# Patient Record
Sex: FEMALE | Race: WHITE | NOT HISPANIC OR LATINO | Employment: STUDENT | URBAN - METROPOLITAN AREA
[De-identification: names, ages, dates, MRNs, and addresses within clinical notes are randomized per-mention and may not be internally consistent; named-entity substitution may affect disease eponyms.]

---
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
## 2022-04-10 ENCOUNTER — NURSE TRIAGE (OUTPATIENT)
Dept: CALL CENTER | Facility: HOSPITAL | Age: 22
End: 2022-04-10

## 2022-04-10 NOTE — TELEPHONE ENCOUNTER
"    Reason for Disposition  • [1] SEVERE vomiting (e.g., 6 or more times/day, vomits everything) BUT [2] hydrated    Additional Information  • Negative: Shock suspected (e.g., cold/pale/clammy skin, too weak to stand, low BP, rapid pulse)  • Negative: Difficult to awaken or acting confused (e.g., disoriented, slurred speech)  • Negative: Sounds like a life-threatening emergency to the triager  • Negative: Vomiting occurs only while coughing  • Negative: [1] Pregnant < 20 Weeks AND [2] nausea/vomiting began in early pregnancy (i.e., 4-8 weeks pregnant)  • Negative: Chest pain  • Negative: Headache is main symptom  • Negative: Vomiting (or Nausea) in a cancer patient who is currently (or recently) receiving chemotherapy or radiation therapy, or cancer patient who has metastatic or end-stage cancer and is receiving palliative care  • Negative: [1] Vomiting AND [2] contains red blood or black (\"coffee ground\") material  (Exception: few red streaks in vomit that only happened once)  • Negative: Severe pain in one eye  • Negative: Recent head injury (within last 3 days)  • Negative: Recent abdominal injury (within last 3 days)  • Negative: [1] Insulin-dependent diabetes (Type I) AND [2] glucose > 400 mg/dl (22 mmol/l)  • Negative: [1] Vomiting AND [2] hernia is more painful or swollen than usual  • Negative: [1] SEVERE vomiting (e.g., 6 or more times/day) AND [2] present > 8 hours (Exception: patient sounds well, is drinking liquids, does not sound dehydrated, and vomiting has lasted less than 24 hours)  • Negative: [1] MODERATE vomiting (e.g., 3 - 5 times/day) AND [2] age > 60 years  • Negative: Severe headache (e.g., excruciating) (Exception: similar to previous migraines)  • Negative: High-risk adult (e.g., diabetes mellitus, brain tumor, V-P shunt, hernia)  • Negative: [1] Drinking very little AND [2] dehydration suspected (e.g., no urine > 12 hours, very dry mouth, very lightheaded)  • Negative: Patient sounds very " "sick or weak to the triager  • Negative: [1] Vomiting AND [2] abdomen looks much more swollen than usual  • Negative: [1] Vomiting AND [2] contains bile (green color)  • Negative: [1] Constant abdominal pain AND [2] present > 2 hours  • Negative: [1] Fever > 103 F (39.4 C) AND [2] not able to get the fever down using Fever Care Advice  • Negative: [1] Fever > 101 F (38.3 C) AND [2] age > 60 years  • Negative: [1] Fever > 100.0 F (37.8 C) AND [2] bedridden (e.g., nursing home patient, CVA, chronic illness, recovering from surgery)  • Negative: [1] Fever > 100.0 F (37.8 C) AND [2] weak immune system (e.g., HIV positive, cancer chemo, splenectomy, organ transplant, chronic steroids)  • Negative: Taking any of the following medications: digoxin (Lanoxin), lithium, theophylline, phenytoin (Dilantin)  • Negative: [1] MILD or MODERATE vomiting AND [2] present > 48 hours (2 days) (Exception: mild vomiting with associated diarrhea)  • Negative: Fever present > 3 days (72 hours)  • Negative: Vomiting a prescription medication  • Negative: [1] MILD vomiting with diarrhea AND [2] present > 5 days  • Negative: Substance use (drug use) or unhealthy alcohol use, known or suspected  • Negative: Vomiting is a chronic symptom (recurrent or ongoing AND present > 4 weeks)  • Negative: MILD or MODERATE vomiting (e.g., 1 - 5 times / day)  • Negative: MILD vomiting with diarrhea  • Negative: Possibly pregnant, questions about  • Negative: Fever treatment, questions about    Answer Assessment - Initial Assessment Questions  1. VOMITING SEVERITY: \"How many times have you vomited in the past 24 hours?\"      - MILD:  1 - 2 times/day     - MODERATE: 3 - 5 times/day, decreased oral intake without significant weight loss or symptoms of dehydration     - SEVERE: 6 or more times/day, vomits everything or nearly everything, with significant weight loss, symptoms of dehydration       6-7  2. ONSET: \"When did the vomiting begin?\"       midnight  3. " "FLUIDS: \"What fluids or food have you vomited up today?\" \"Have you been able to keep any fluids down?\"      Gatorade. water  4. ABDOMINAL PAIN: \"Are your having any abdominal pain?\" If yes : \"How bad is it and what does it feel like?\" (e.g., crampy, dull, intermittent, constant)       Cramping yes  5. DIARRHEA: \"Is there any diarrhea?\" If Yes, ask: \"How many times today?\"       3 times so far  6. 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 CAUSE: \"What do you think is causing your vomiting?\"      *stomach bug  8. HYDRATION STATUS: \"Any signs of dehydration?\" (e.g., dry mouth [not only dry lips], too weak to stand) \"When did you last urinate?\"      Denies dehydration  9. OTHER SYMPTOMS: \"Do you have any other symptoms?\" (e.g., fever, headache, vertigo, vomiting blood or coffee grounds, recent head injury)      n/a  10. PREGNANCY: \"Is there any chance you are pregnant?\" \"When was your last menstrual period?\"        n/a    Protocols used: VOMITING-ADULT-AH      "

## 2024-03-19 ENCOUNTER — OFFICE VISIT (OUTPATIENT)
Dept: INTERNAL MEDICINE | Facility: CLINIC | Age: 24
End: 2024-03-19
Payer: COMMERCIAL

## 2024-03-19 VITALS
HEART RATE: 76 BPM | SYSTOLIC BLOOD PRESSURE: 100 MMHG | RESPIRATION RATE: 12 BRPM | TEMPERATURE: 97.1 F | BODY MASS INDEX: 28.31 KG/M2 | HEIGHT: 64 IN | OXYGEN SATURATION: 100 % | DIASTOLIC BLOOD PRESSURE: 64 MMHG | WEIGHT: 165.8 LBS

## 2024-03-19 DIAGNOSIS — K58.2 IRRITABLE BOWEL SYNDROME WITH BOTH CONSTIPATION AND DIARRHEA: ICD-10-CM

## 2024-03-19 DIAGNOSIS — K92.1 BLOOD IN STOOL: Primary | ICD-10-CM

## 2024-03-19 PROCEDURE — 99214 OFFICE O/P EST MOD 30 MIN: CPT

## 2024-03-19 RX ORDER — CETIRIZINE HYDROCHLORIDE 10 MG/1
10 TABLET ORAL AS NEEDED
COMMUNITY

## 2024-03-19 RX ORDER — POLYETHYLENE GLYCOL 3350 17 G/17G
17 POWDER, FOR SOLUTION ORAL DAILY PRN
COMMUNITY

## 2024-03-19 NOTE — PROGRESS NOTES
"     New Patient Office Visit      Date: 2024  Patient Name: Brook Webb  : 2000   MRN: [id number]     Chief Complaint:    Chief Complaint   Patient presents with    Follow-up     Urgent care follow up 2024  \"Blood in stool  Anal bleeding   Abdominal pain with tenderness in the suprapubic area\"    No other episodes of blood in stool       Establish Care       History of Present Illness: Brook Webb is a 23 y.o. female who is here today to establish care.     She is from [place], played soccer at [employer], works at [employer] as a .     Brook reports having 2 episodes of blood in stool last week. She went to urgent care and states they checked her hemoglobin, which was normal, but was referred to PCP for further evaluation. Reports moderate amount of bright red blood after having bowel movement. She says the blood was dripping and it took several wipes to clean up. States she was constipated last week and did have to strain some with her BM but denies pain with BM s. She denies having abdominal pain, cramping or nausea/vomiting. Denies any episodes of blood in her stool since. She denies every having hemorrhoids.    She has a hx of IBS, states she was dx in . Did see a GI dr then and had colonoscopy completed in . Results were negative for any polyps or lesions but she does have a small hiatal hernia. For the pat 2 years has been dealing with alternating bowel patterns. Will have several days of diarrhea and then will have bouts of constipation and bloating. She does not take any medications for this, has worked on her diet some to help with symptoms. She does have a f/u with her GI at  in 1 month via zoom, requested to be seen in person but they will only see her telehealth. Is interested in referral to GI with [facility] for evaluation. She is currently taking miralax bid and increasing her water and fiber intake. She is active and eats a healthy diet. " "    Denies any family hx of colon cancer, crohn's disease or ulcerative colitis    Subjective      Review of Systems:   Review of Systems     Past Medical History:   Past Medical History:   Diagnosis Date    Anxiety     Asthma 6 years old    Heart murmur Birth    IBS (irritable bowel syndrome)        Past Surgical History:   Past Surgical History:   Procedure Laterality Date    ADENOIDECTOMY      COLONOSCOPY  2022    TONSILLECTOMY         Family History:   Family History   Problem Relation Age of Onset    Alcohol abuse Father        Social History:   Social History     Socioeconomic History    Marital status: Single   Tobacco Use    Smoking status: Never    Smokeless tobacco: Never   Vaping Use    Vaping status: Never Used   Substance and Sexual Activity    Alcohol use: Yes     Alcohol/week: 3.0 standard drinks of alcohol     Types: 3 Drinks containing 0.5 oz of alcohol per week    Drug use: Never    Sexual activity: Yes     Partners: Male     Birth control/protection: Birth control pill       Medications:     Current Outpatient Medications:     ALBUTEROL SULFATE IN, , Disp: , Rfl:     cetirizine (ZyrTEC Allergy) 10 MG tablet, Take 1 tablet by mouth As Needed for Allergies., Disp: , Rfl:     escitalopram (LEXAPRO) 20 MG tablet, TAKE ONE TABLET BY MOUTH DAILY for 30, Disp: , Rfl:     Clare 0.25-35 MG-MCG per tablet, Take 1 tablet by mouth Daily., Disp: , Rfl:     propranolol (INDERAL) 10 MG tablet, , Disp: , Rfl:     polyethylene glycol (MiraLax) 17 g packet, Take 17 g by mouth Daily As Needed., Disp: , Rfl:     Allergies:   Allergies   Allergen Reactions    Barium Sulfate Hives    Bee Venom Hives    Latex Hives       Objective     Physical Exam:  Vital Signs:   Vitals:    03/19/24 1347   BP: 100/64   BP Location: Left arm   Patient Position: Sitting   Cuff Size: Adult   Pulse: 76   Resp: 12   Temp: 97.1 °F (36.2 °C)   TempSrc: Infrared   SpO2: 100%   Weight: 75.2 kg (165 lb 12.8 oz)   Height: 162.6 cm (64.02\") "     Body mass index is 28.45 kg/m².       Physical Exam  Vitals reviewed. Exam conducted with a chaperone present.   Constitutional:       Appearance: Normal appearance. She is not ill-appearing.   HENT:      Head: Normocephalic and atraumatic.      Right Ear: Tympanic membrane, ear canal and external ear normal. There is no impacted cerumen.      Left Ear: Tympanic membrane, ear canal and external ear normal. There is no impacted cerumen.      Nose: Nose normal. No congestion or rhinorrhea.      Mouth/Throat:      Mouth: Mucous membranes are moist.      Pharynx: Oropharynx is clear. Uvula midline. No oropharyngeal exudate or posterior oropharyngeal erythema.      Tonsils: No tonsillar exudate or tonsillar abscesses. 0 on the right. 0 on the left.   Eyes:      Extraocular Movements: Extraocular movements intact.      Conjunctiva/sclera: Conjunctivae normal.      Pupils: Pupils are equal, round, and reactive to light.   Neck:      Thyroid: No thyroid mass, thyromegaly or thyroid tenderness.   Cardiovascular:      Rate and Rhythm: Normal rate and regular rhythm.      Pulses: Normal pulses.           Radial pulses are 2+ on the right side and 2+ on the left side.      Heart sounds: Normal heart sounds, S1 normal and S2 normal. No murmur heard.  Pulmonary:      Effort: Pulmonary effort is normal. No tachypnea.      Breath sounds: Normal breath sounds and air entry. No decreased breath sounds, wheezing or rhonchi.   Abdominal:      General: Abdomen is flat. Bowel sounds are normal.      Palpations: Abdomen is soft.      Tenderness: There is no abdominal tenderness. There is no guarding or rebound.   Genitourinary:     Rectum: Normal. No mass or external hemorrhoid.   Musculoskeletal:      Right lower leg: No edema.      Left lower leg: No edema.   Lymphadenopathy:      Cervical: No cervical adenopathy.   Skin:     General: Skin is warm and dry.      Capillary Refill: Capillary refill takes less than 2 seconds.    Neurological:      General: No focal deficit present.      Mental Status: She is alert and oriented to person, place, and time. Mental status is at baseline.      Sensory: Sensation is intact.      Motor: Motor function is intact.      Coordination: Coordination is intact.      Gait: Gait is intact.   Psychiatric:         Attention and Perception: Attention and perception normal.         Mood and Affect: Mood and affect normal.         Speech: Speech normal.         Behavior: Behavior normal. Behavior is cooperative.         Thought Content: Thought content normal.         Cognition and Memory: Cognition and memory normal.         Judgment: Judgment normal.         Assessment / Plan      Assessment/Plan:   Diagnoses and all orders for this visit:    1. Blood in stool (Primary)  -     Ambulatory Referral to Gastroenterology  -     POCT Occult blood x 3, stool; Future    2. Irritable bowel syndrome with both constipation and diarrhea  -     Ambulatory Referral to Gastroenterology  -     POCT Occult blood x 3, stool; Future    Rectal exam performed with chaperone, no evidence of external hemorrhoids.  Will have pt RTC with stool samples to check for blood  Referral sent to GI  Provided pt with information on low FODMAP diet to help with IBS symptoms  Will f/u in 3 months or sooner if needed.    Follow Up:   Return in about 3 months (around 6/19/2024).    If a referral was made please contact our office if you have not heard about an appointment in the next 2 weeks.   If labs or images are ordered we will contact you with the results within the next week.  If you have not heard from us after a week please call our office to inquire about the results.    At [facility], we believe that sharing information builds trust and better relationships. You are receiving this note because you recently visited [facility]. It is possible you will see health information before a provider has talked with you about it. This  kind of information can be easy to misunderstand. To help you fully understand what it means for your health, we urge you to discuss this note with your provider.    DIAZ Hernandez   [facility]

## 2024-03-20 DIAGNOSIS — K92.1 BLOOD IN STOOL: Primary | ICD-10-CM

## 2024-03-20 LAB
DEVELOPER EXPIRATION DATE: NORMAL
DEVELOPER LOT NUMBER: NORMAL
EXPIRATION DATE: NORMAL
FECAL OCCULT BLOOD SCREEN, POC: NEGATIVE
Lab: NORMAL
NEGATIVE CONTROL: NEGATIVE
POSITIVE CONTROL: POSITIVE

## 2024-03-20 PROCEDURE — 82270 OCCULT BLOOD FECES: CPT

## 2024-03-26 DIAGNOSIS — K92.1 BLOOD IN STOOL: Primary | ICD-10-CM

## 2024-03-26 LAB
DEVELOPER EXPIRATION DATE: NORMAL
DEVELOPER EXPIRATION DATE: NORMAL
DEVELOPER LOT NUMBER: NORMAL
DEVELOPER LOT NUMBER: NORMAL
EXPIRATION DATE: NORMAL
EXPIRATION DATE: NORMAL
FECAL OCCULT BLOOD SCREEN, POC: NEGATIVE
FECAL OCCULT BLOOD SCREEN, POC: NEGATIVE
Lab: NORMAL
Lab: NORMAL
NEGATIVE CONTROL: NEGATIVE
NEGATIVE CONTROL: NEGATIVE
POSITIVE CONTROL: POSITIVE
POSITIVE CONTROL: POSITIVE

## 2024-04-05 ENCOUNTER — LAB (OUTPATIENT)
Dept: LAB | Facility: HOSPITAL | Age: 24
End: 2024-04-05
Payer: COMMERCIAL

## 2024-04-05 ENCOUNTER — OFFICE VISIT (OUTPATIENT)
Dept: GASTROENTEROLOGY | Facility: CLINIC | Age: 24
End: 2024-04-05
Payer: COMMERCIAL

## 2024-04-05 VITALS
DIASTOLIC BLOOD PRESSURE: 72 MMHG | BODY MASS INDEX: 28.41 KG/M2 | HEIGHT: 64 IN | WEIGHT: 166.4 LBS | HEART RATE: 75 BPM | SYSTOLIC BLOOD PRESSURE: 122 MMHG | TEMPERATURE: 96.5 F | OXYGEN SATURATION: 98 %

## 2024-04-05 DIAGNOSIS — R11.0 NAUSEA: ICD-10-CM

## 2024-04-05 DIAGNOSIS — K44.9 HIATAL HERNIA: ICD-10-CM

## 2024-04-05 DIAGNOSIS — K58.0 IRRITABLE BOWEL SYNDROME WITH DIARRHEA: ICD-10-CM

## 2024-04-05 DIAGNOSIS — K62.5 RECTAL BLEEDING: ICD-10-CM

## 2024-04-05 DIAGNOSIS — R14.0 ABDOMINAL BLOATING: ICD-10-CM

## 2024-04-05 DIAGNOSIS — K58.0 IRRITABLE BOWEL SYNDROME WITH DIARRHEA: Primary | ICD-10-CM

## 2024-04-05 LAB
25(OH)D3 SERPL-MCNC: 54.8 NG/ML (ref 30–100)
ALBUMIN SERPL-MCNC: 4.4 G/DL (ref 3.5–5.2)
ALBUMIN/GLOB SERPL: 1.7 G/DL
ALP SERPL-CCNC: 79 U/L (ref 39–117)
ALT SERPL W P-5'-P-CCNC: 19 U/L (ref 1–33)
ANION GAP SERPL CALCULATED.3IONS-SCNC: 11 MMOL/L (ref 5–15)
AST SERPL-CCNC: 73 U/L (ref 1–32)
BASOPHILS # BLD AUTO: 0.07 10*3/MM3 (ref 0–0.2)
BASOPHILS NFR BLD AUTO: 0.7 % (ref 0–1.5)
BILIRUB SERPL-MCNC: 0.2 MG/DL (ref 0–1.2)
BUN SERPL-MCNC: 10 MG/DL (ref 6–20)
BUN/CREAT SERPL: 12.2 (ref 7–25)
CALCIUM SPEC-SCNC: 9.3 MG/DL (ref 8.6–10.5)
CHLORIDE SERPL-SCNC: 107 MMOL/L (ref 98–107)
CO2 SERPL-SCNC: 22 MMOL/L (ref 22–29)
CREAT SERPL-MCNC: 0.82 MG/DL (ref 0.57–1)
CRP SERPL-MCNC: 0.75 MG/DL (ref 0–0.5)
DEPRECATED RDW RBC AUTO: 40.4 FL (ref 37–54)
EGFRCR SERPLBLD CKD-EPI 2021: 102.6 ML/MIN/1.73
EOSINOPHIL # BLD AUTO: 0.1 10*3/MM3 (ref 0–0.4)
EOSINOPHIL NFR BLD AUTO: 0.9 % (ref 0.3–6.2)
ERYTHROCYTE [DISTWIDTH] IN BLOOD BY AUTOMATED COUNT: 12.4 % (ref 12.3–15.4)
GLOBULIN UR ELPH-MCNC: 2.6 GM/DL
GLUCOSE SERPL-MCNC: 101 MG/DL (ref 65–99)
HCT VFR BLD AUTO: 38 % (ref 34–46.6)
HGB BLD-MCNC: 12.7 G/DL (ref 12–15.9)
IMM GRANULOCYTES # BLD AUTO: 0.04 10*3/MM3 (ref 0–0.05)
IMM GRANULOCYTES NFR BLD AUTO: 0.4 % (ref 0–0.5)
LIPASE SERPL-CCNC: 31 U/L (ref 13–60)
LYMPHOCYTES # BLD AUTO: 2.24 10*3/MM3 (ref 0.7–3.1)
LYMPHOCYTES NFR BLD AUTO: 21.2 % (ref 19.6–45.3)
MAGNESIUM SERPL-MCNC: 2 MG/DL (ref 1.6–2.6)
MCH RBC QN AUTO: 29.7 PG (ref 26.6–33)
MCHC RBC AUTO-ENTMCNC: 33.4 G/DL (ref 31.5–35.7)
MCV RBC AUTO: 88.8 FL (ref 79–97)
MONOCYTES # BLD AUTO: 0.66 10*3/MM3 (ref 0.1–0.9)
MONOCYTES NFR BLD AUTO: 6.2 % (ref 5–12)
NEUTROPHILS NFR BLD AUTO: 7.48 10*3/MM3 (ref 1.7–7)
NEUTROPHILS NFR BLD AUTO: 70.6 % (ref 42.7–76)
NRBC BLD AUTO-RTO: 0 /100 WBC (ref 0–0.2)
PLATELET # BLD AUTO: 435 10*3/MM3 (ref 140–450)
PMV BLD AUTO: 9.8 FL (ref 6–12)
POTASSIUM SERPL-SCNC: 4.2 MMOL/L (ref 3.5–5.2)
PROT SERPL-MCNC: 7 G/DL (ref 6–8.5)
RBC # BLD AUTO: 4.28 10*6/MM3 (ref 3.77–5.28)
SODIUM SERPL-SCNC: 140 MMOL/L (ref 136–145)
TSH SERPL DL<=0.05 MIU/L-ACNC: 1.24 UIU/ML (ref 0.27–4.2)
WBC NRBC COR # BLD AUTO: 10.59 10*3/MM3 (ref 3.4–10.8)

## 2024-04-05 PROCEDURE — 86003 ALLG SPEC IGE CRUDE XTRC EA: CPT

## 2024-04-05 PROCEDURE — 82306 VITAMIN D 25 HYDROXY: CPT | Performed by: PHYSICIAN ASSISTANT

## 2024-04-05 PROCEDURE — 86231 EMA EACH IG CLASS: CPT

## 2024-04-05 PROCEDURE — 83690 ASSAY OF LIPASE: CPT | Performed by: PHYSICIAN ASSISTANT

## 2024-04-05 PROCEDURE — 82784 ASSAY IGA/IGD/IGG/IGM EACH: CPT

## 2024-04-05 PROCEDURE — 82785 ASSAY OF IGE: CPT

## 2024-04-05 PROCEDURE — 86140 C-REACTIVE PROTEIN: CPT

## 2024-04-05 PROCEDURE — 86364 TISS TRNSGLTMNASE EA IG CLAS: CPT

## 2024-04-05 PROCEDURE — 83735 ASSAY OF MAGNESIUM: CPT | Performed by: PHYSICIAN ASSISTANT

## 2024-04-05 PROCEDURE — 86258 DGP ANTIBODY EACH IG CLASS: CPT

## 2024-04-05 PROCEDURE — 80050 GENERAL HEALTH PANEL: CPT

## 2024-04-05 PROCEDURE — 36415 COLL VENOUS BLD VENIPUNCTURE: CPT

## 2024-04-05 PROCEDURE — 86008 ALLG SPEC IGE RECOMB EA: CPT

## 2024-04-05 RX ORDER — SODIUM PICOSULFATE, MAGNESIUM OXIDE, AND ANHYDROUS CITRIC ACID 10; 3.5; 12 MG/160ML; G/160ML; G/160ML
350 LIQUID ORAL TAKE AS DIRECTED
Qty: 350 ML | Refills: 0 | Status: SHIPPED | OUTPATIENT
Start: 2024-04-05

## 2024-04-05 NOTE — PROGRESS NOTES
New Patient Consultation     Patient Name: Brook Webb  : 2000   MRN: [id number]     Chief Complaint:    Chief Complaint   Patient presents with    Black or Bloody Stool     Hx of ibs d        History of Present Illness: Brook Webb is a 23 y.o. female, PMH includes anxiety, asthma, who is here today for a Gastroenterology Consultation for IBS.     Pt was seen by DIAZ Montemayor at  GI 2023 for ongoing abdominal pain, alternating constipation / diarrhea, nausea / vomiting. Previous GI workup includes CTE 2022 (unremarkable), HIDA scan 2022 with gallbladder ejection fraction 54%, RUQ historically unremarkable. Gastrointestinal PCR, ova + parasites, Strongyloides Ab historically negative. She was prescribed Elavil 10mg qhs.     Pt states that she took Elavil for about 1.5 years, which helped overall with abdominal pain and N/V, but she developed subsequent constipation and weight gain. She stopped taking such about one month ago. She continues to experience generalized abdominal bloating postprandially, worse with most all foods but especially greasy / fried, bananas, meats, etc. She generally has a BM every other day that is incomplete. She uses Miralax sporadically which causes subsequent diarrhea. She had an episode of rectal bleeding x 2 days about three weeks ago, was seen at urgent care and had a (-) hemoccult and normal H/H.     Previous EGD  at  reveals small hiatal hernia, normal stomach and duodenum. CSY  at  reveals normal colon and TI, normal colon bx.     Patient denies associated fever, chills, indigestion, vomiting, hematemesis, dysphagia, melena, dysuria, jaundice or bruising.    Patient denies personal or FHx of PUD, H Pylori, gastritis, pancreatitis, colitis, Celiac disease, UC, Crohn's disease, colon or gastric cancers. Pt denies tobacco, illicit substance use. Social EtOH use. Rare NSAID use. No previous abdominal surgeries.     Subjective      Review of  Systems:   Review of Systems   Constitutional:  Negative for appetite change, chills, diaphoresis, fatigue and fever.   HENT:  Negative for drooling, facial swelling, mouth sores, rhinorrhea, sore throat, tinnitus, trouble swallowing and voice change.    Eyes: Negative.    Respiratory:  Negative for cough, chest tightness and shortness of breath.    Cardiovascular:  Negative for chest pain.   Gastrointestinal:  Positive for abdominal distention, constipation, diarrhea and nausea. Negative for abdominal pain, blood in stool, vomiting, GERD and indigestion.   Genitourinary:  Negative for dysuria, flank pain, hematuria and pelvic pain.   Musculoskeletal:  Negative for arthralgias and myalgias.   Skin:  Negative for color change, pallor and rash.   Neurological:  Negative for dizziness, tremors, syncope, weakness and numbness.   Psychiatric/Behavioral:  Negative for hallucinations and sleep disturbance. The patient is not nervous/anxious.    All other systems reviewed and are negative.      Past Medical History:   Past Medical History:   Diagnosis Date    Anxiety     Asthma 6 years old    Heart murmur Birth    IBS (irritable bowel syndrome)        Past Surgical History:   Past Surgical History:   Procedure Laterality Date    ADENOIDECTOMY      COLONOSCOPY  2022    TONSILLECTOMY         Family History:   Family History   Problem Relation Age of Onset    Alcohol abuse Father        Social History:   Social History     Socioeconomic History    Marital status: Single   Tobacco Use    Smoking status: Never    Smokeless tobacco: Never   Vaping Use    Vaping status: Never Used   Substance and Sexual Activity    Alcohol use: Yes     Alcohol/week: 3.0 standard drinks of alcohol     Types: 3 Drinks containing 0.5 oz of alcohol per week    Drug use: Never    Sexual activity: Yes     Partners: Male     Birth control/protection: Birth control pill       Alcohol/Tobacco History:   Social History     Substance and Sexual Activity    Alcohol Use Yes    Alcohol/week: 3.0 standard drinks of alcohol    Types: 3 Drinks containing 0.5 oz of alcohol per week     Social History     Tobacco Use   Smoking Status Never   Smokeless Tobacco Never       Medications:     Current Outpatient Medications:     ALBUTEROL SULFATE IN, , Disp: , Rfl:     cetirizine (ZyrTEC Allergy) 10 MG tablet, Take 1 tablet by mouth As Needed for Allergies., Disp: , Rfl:     escitalopram (LEXAPRO) 20 MG tablet, TAKE ONE TABLET BY MOUTH DAILY for 30, Disp: , Rfl:     Clare 0.25-35 MG-MCG per tablet, Take 1 tablet by mouth Daily., Disp: , Rfl:     polyethylene glycol (MiraLax) 17 g packet, Take 17 g by mouth Daily As Needed., Disp: , Rfl:     propranolol (INDERAL) 10 MG tablet, , Disp: , Rfl:     Allergies:   Allergies   Allergen Reactions    Barium Sulfate Hives    Bee Venom Hives    Latex Hives       Objective     Physical Exam:  Vital Signs: There were no vitals filed for this visit.  There is no height or weight on file to calculate BMI.     Physical Exam  Vitals and nursing note reviewed.   Constitutional:       Appearance: Normal appearance. She is normal weight. She is not ill-appearing or diaphoretic.      Comments: Pleasantly conversant. BMI 28.55.    HENT:      Head: Normocephalic and atraumatic.      Right Ear: External ear normal.      Left Ear: External ear normal.      Nose: Nose normal.      Mouth/Throat:      Mouth: Mucous membranes are moist.      Pharynx: Oropharynx is clear.   Eyes:      Conjunctiva/sclera: Conjunctivae normal.      Pupils: Pupils are equal, round, and reactive to light.   Neck:      Thyroid: No thyromegaly.   Cardiovascular:      Rate and Rhythm: Normal rate and regular rhythm.      Pulses: Normal pulses.      Heart sounds: Normal heart sounds.   Pulmonary:      Effort: Pulmonary effort is normal.      Breath sounds: Normal breath sounds.   Abdominal:      General: Abdomen is flat. Bowel sounds are normal. There is no distension.      Tenderness:  There is no abdominal tenderness.   Genitourinary:     Comments: Deferred  Musculoskeletal:         General: Normal range of motion.      Cervical back: Normal range of motion and neck supple.   Skin:     General: Skin is warm and dry.   Neurological:      General: No focal deficit present.      Mental Status: She is oriented to person, place, and time.   Psychiatric:         Mood and Affect: Mood normal.         Assessment / Plan      Assessment/Plan:   There are no diagnoses linked to this encounter.     IBS-D  Bloating  Nausea  Rectal bleeding, intermittent  Hiatal hernia   - rx for Xifaxan 550mg TID x 14 days sent to pharmacy    - pt given instructions to trial gluten free diet x 3 weeks, followed by low FODMAP diet   - obtain CBC, CMP, CRP, celiac panel, food allergy profile, alpha gal panel, Mg, TSH, vit D, lipase, C13 breath test   - previous office notes, hospital records, labs, imaging, endoscopy and pathology reports reviewed with patient    - schedule for EGD / CSY   - follow up in clinic after completion of above studies   - call clinic at any time for questions or new / worsened sx    Follow Up:   Return in about 6 weeks (around 5/17/2024).    Plan of care reviewed with the patient at the conclusion of today's visit.  Education was provided regarding diagnosis, management, and any prescribed or recommended OTC medications.  Patient verbalized understanding of and agreement with management plan.     NOTE TO PATIENT: The 21st Century Cures Act makes medical notes like these available to patients in the interest of transparency. However, be advised this is a medical document. It is intended as peer to peer communication. It is written in medical language and may contain abbreviations or verbiage that are unfamiliar. It may appear blunt or direct. Medical documents are intended to carry relevant information, facts as evident, and the clinical opinion of the practitioner.     Time Statement:   Discussed plan  of care in detail with patient today. Patient verbally understands and agrees. I have spent 60 minutes reviewing available diagnostics, obtaining history, examining the patient, developing a treatment plan, and educating the patient on disease process and plan of care.    Cate Regalado PA-C   [facility]

## 2024-04-08 LAB
ENDOMYSIUM IGA SER QL: NEGATIVE
GLIADIN PEPTIDE IGA SER-ACNC: 4 UNITS (ref 0–19)
GLIADIN PEPTIDE IGG SER-ACNC: 2 UNITS (ref 0–19)
IGA SERPL-MCNC: 98 MG/DL (ref 87–352)
TTG IGA SER-ACNC: <2 U/ML (ref 0–3)
TTG IGG SER-ACNC: 3 U/ML (ref 0–5)

## 2024-04-08 NOTE — PROGRESS NOTES
Please let Brook know that her CBC, CMP, lipase, Mg, TSH, vit D are essentially normal. She has one slightly elevated LFT, which we will recheck in the future. Her CRP is slightly elevated and we will evaluate further at time of endoscopy.     Food allergy, alpha gal and celiac panels pending.  home

## 2024-04-11 LAB
CLAM IGE QN: <0.1 KU/L
CODFISH IGE QN: <0.1 KU/L
CONV CLASS DESCRIPTION: NORMAL
CORN IGE QN: <0.1 KU/L
COW MILK IGE QN: <0.1 KU/L
EGG WHITE IGE QN: <0.1 KU/L
PEANUT IGE QN: <0.1 KU/L
SCALLOP IGE QN: <0.1 KU/L
SESAME SEED IGE QN: <0.1 KU/L
SHRIMP IGE QN: <0.1 KU/L
SOYBEAN IGE QN: <0.1 KU/L
WALNUT IGE QN: <0.1 KU/L
WHEAT IGE QN: <0.1 KU/L

## 2024-04-12 ENCOUNTER — OUTSIDE FACILITY SERVICE (OUTPATIENT)
Dept: GASTROENTEROLOGY | Facility: CLINIC | Age: 24
End: 2024-04-12
Payer: COMMERCIAL

## 2024-04-12 LAB
ALPHA-GAL IGE QN: <0.1 KU/L
BEEF IGE QN: <0.1 KU/L
CONV CLASS DESCRIPTION: ABNORMAL
IGE SERPL-ACNC: <2 IU/ML (ref 6–495)
LAMB IGE QN: <0.1 KU/L
PORK IGE QN: <0.1 KU/L

## 2024-04-12 PROCEDURE — 45385 COLONOSCOPY W/LESION REMOVAL: CPT | Performed by: INTERNAL MEDICINE

## 2024-04-12 PROCEDURE — 45380 COLONOSCOPY AND BIOPSY: CPT | Performed by: INTERNAL MEDICINE

## 2024-04-12 PROCEDURE — 43239 EGD BIOPSY SINGLE/MULTIPLE: CPT | Performed by: INTERNAL MEDICINE

## 2024-04-12 PROCEDURE — 88305 TISSUE EXAM BY PATHOLOGIST: CPT | Performed by: INTERNAL MEDICINE

## 2024-04-15 ENCOUNTER — LAB REQUISITION (OUTPATIENT)
Dept: LAB | Facility: HOSPITAL | Age: 24
End: 2024-04-15
Payer: COMMERCIAL

## 2024-04-15 DIAGNOSIS — K92.1 MELENA: ICD-10-CM

## 2024-04-15 DIAGNOSIS — K64.8 OTHER HEMORRHOIDS: ICD-10-CM

## 2024-04-15 DIAGNOSIS — K21.9 GASTRO-ESOPHAGEAL REFLUX DISEASE WITHOUT ESOPHAGITIS: ICD-10-CM

## 2024-04-15 DIAGNOSIS — D12.3 BENIGN NEOPLASM OF TRANSVERSE COLON: ICD-10-CM

## 2024-04-15 DIAGNOSIS — R19.7 DIARRHEA, UNSPECIFIED: ICD-10-CM

## 2024-04-15 DIAGNOSIS — K31.89 OTHER DISEASES OF STOMACH AND DUODENUM: ICD-10-CM

## 2024-04-15 DIAGNOSIS — K44.9 DIAPHRAGMATIC HERNIA WITHOUT OBSTRUCTION OR GANGRENE: ICD-10-CM

## 2024-04-15 DIAGNOSIS — R10.9 UNSPECIFIED ABDOMINAL PAIN: ICD-10-CM

## 2024-04-16 LAB — REF LAB TEST METHOD: NORMAL

## 2024-04-17 DIAGNOSIS — D36.9 ADENOMA: Primary | ICD-10-CM

## 2024-06-13 ENCOUNTER — OFFICE VISIT (OUTPATIENT)
Dept: GASTROENTEROLOGY | Facility: CLINIC | Age: 24
End: 2024-06-13
Payer: COMMERCIAL

## 2024-06-13 VITALS
BODY MASS INDEX: 27.21 KG/M2 | TEMPERATURE: 97.8 F | SYSTOLIC BLOOD PRESSURE: 118 MMHG | WEIGHT: 159.4 LBS | HEART RATE: 83 BPM | OXYGEN SATURATION: 98 % | HEIGHT: 64 IN | DIASTOLIC BLOOD PRESSURE: 62 MMHG

## 2024-06-13 DIAGNOSIS — K58.0 IRRITABLE BOWEL SYNDROME WITH DIARRHEA: Primary | ICD-10-CM

## 2024-06-13 DIAGNOSIS — D12.6 TUBULAR ADENOMA OF COLON: ICD-10-CM

## 2024-06-13 DIAGNOSIS — K90.41 GLUTEN INTOLERANCE: ICD-10-CM

## 2024-06-13 DIAGNOSIS — K21.9 GASTROESOPHAGEAL REFLUX DISEASE, UNSPECIFIED WHETHER ESOPHAGITIS PRESENT: ICD-10-CM

## 2024-06-13 NOTE — PROGRESS NOTES
Follow Up      Patient Name: Brook Webb  : 2000   MRN: [id number]     Chief Complaint:    Chief Complaint   Patient presents with    Follow-up     Egd/colon       History of Present Illness: Brook Webb is a 23 y.o. female who is here today for post procedure follow up.     Pt notes no improvement of bloating, stool changes with course of Xifaxan. She has been following gluten free diet x 1 month and overall notes improvement of these sx. She generally has a formed, complete BM daily and denies issues with urgency. Patient denies associated fever, chills, abdominal pain, indigestion, nausea, vomiting, diarrhea, constipation, hematemesis, dysphagia, hematochezia, melena, dysuria, jaundice or bruising.    Labs since last visit reveal normal food allergy profile, celiac panel, alpha gal panel. CBC, CMP, lipase, Mg, TSH, vit D are essentially normal. CRP 0.75. Sucrose breath test normal.     EGD / CSY 2024 with Dr. Real. Normal esophagus. ESCOBAR evident by free flow of gastric contents in the lower third of the esophagus. Small hiatal hernia. Bilious fluid in gastric body and fundus. Mild gastritis in gastric body. Normal duodenum. Bx negative for H Pylori, celiac disease, intestinal metaplasia or dysplasia. Adequate bowel prep conditions. Internal hemorrhoids. Normal terminal ileum, one diminutive apthous erosion in TI. 5mm polyp (tubular adenoma) in transverse colon, resected and retrieved. Normal appearing colon otherwise. Random colon bx negative for microscopic colitis. Recommend repeat CSY in 1 year.     Subjective      Review of Systems:   Review of Systems   Constitutional:  Negative for appetite change, chills, diaphoresis, fatigue, fever, unexpected weight gain and unexpected weight loss.   HENT:  Negative for drooling, facial swelling, mouth sores, rhinorrhea, sore throat, tinnitus, trouble swallowing and voice change.    Eyes: Negative.    Respiratory:  Negative for cough, chest tightness  and shortness of breath.    Cardiovascular:  Negative for chest pain.   Gastrointestinal:  Negative for abdominal pain, blood in stool, constipation, diarrhea, nausea, vomiting, GERD and indigestion.   Genitourinary:  Negative for dysuria, flank pain, hematuria and pelvic pain.   Musculoskeletal:  Negative for arthralgias and myalgias.   Skin:  Negative for color change, pallor and rash.   Neurological:  Negative for dizziness, tremors, syncope, weakness and numbness.   Psychiatric/Behavioral:  Negative for hallucinations and sleep disturbance. The patient is not nervous/anxious.    All other systems reviewed and are negative.      Medications:     Current Outpatient Medications:     ALBUTEROL SULFATE IN, , Disp: , Rfl:     cetirizine (ZyrTEC Allergy) 10 MG tablet, Take 1 tablet by mouth As Needed for Allergies., Disp: , Rfl:     escitalopram (LEXAPRO) 20 MG tablet, TAKE ONE TABLET BY MOUTH DAILY for 30, Disp: , Rfl:     Clare 0.25-35 MG-MCG per tablet, Take 1 tablet by mouth Daily., Disp: , Rfl:     polyethylene glycol (MiraLax) 17 g packet, Take 17 g by mouth Daily As Needed., Disp: , Rfl:     propranolol (INDERAL) 10 MG tablet, , Disp: , Rfl:     Sod Picosulfate-Mag Ox-Cit Acd (Clenpiq) 10-3.5-12 MG-GM -GM/160ML solution, Take 350 mL by mouth Take As Directed., Disp: 350 mL, Rfl: 0    Sodium Sulfate-Mag Sulfate-KCl (SUTAB) 7856-055-476 MG tablet, Take 24 tablets by mouth Take As Directed., Disp: 24 tablet, Rfl: 0    Allergies:   Allergies   Allergen Reactions    Barium Sulfate Hives    Bee Venom Hives    Latex Hives       Social History:   Social History     Socioeconomic History    Marital status: Single   Tobacco Use    Smoking status: Never    Smokeless tobacco: Never   Vaping Use    Vaping status: Never Used   Substance and Sexual Activity    Alcohol use: Yes     Alcohol/week: 3.0 standard drinks of alcohol     Types: 3 Drinks containing 0.5 oz of alcohol per week    Drug use: Never    Sexual activity: Yes      Partners: Male     Birth control/protection: Birth control pill        Surgical History:   Past Surgical History:   Procedure Laterality Date    ADENOIDECTOMY      COLONOSCOPY  2022    TONSILLECTOMY          Medical History:   Past Medical History:   Diagnosis Date    Anxiety     Asthma 6 years old    Heart murmur Birth    IBS (irritable bowel syndrome)         Objective     Physical Exam:  Vital Signs: There were no vitals filed for this visit.  There is no height or weight on file to calculate BMI.     Physical Exam  Vitals and nursing note reviewed.   Constitutional:       Appearance: Normal appearance. She is normal weight. She is not ill-appearing or diaphoretic.      Comments: BMI 27.35. Pleasantly conversant.   HENT:      Head: Normocephalic and atraumatic.      Right Ear: External ear normal.      Left Ear: External ear normal.      Nose: Nose normal.      Mouth/Throat:      Mouth: Mucous membranes are moist.      Pharynx: Oropharynx is clear.   Eyes:      Conjunctiva/sclera: Conjunctivae normal.      Pupils: Pupils are equal, round, and reactive to light.   Neck:      Thyroid: No thyromegaly.   Cardiovascular:      Rate and Rhythm: Normal rate and regular rhythm.      Pulses: Normal pulses.      Heart sounds: Normal heart sounds.   Pulmonary:      Effort: Pulmonary effort is normal.      Breath sounds: Normal breath sounds.   Abdominal:      General: Abdomen is flat. Bowel sounds are normal. There is no distension.      Tenderness: There is no abdominal tenderness.   Musculoskeletal:         General: Normal range of motion.      Cervical back: Normal range of motion and neck supple.   Skin:     General: Skin is warm and dry.   Neurological:      General: No focal deficit present.      Mental Status: She is oriented to person, place, and time.   Psychiatric:         Mood and Affect: Mood normal.         Assessment / Plan      Assessment/Plan:   There are no diagnoses linked to this encounter.      IBS-D  GERD  Gluten intolerance  Tubular adenoma of colon   - pt advised to continue gluten free diet   - previous office notes, hospital records, labs, imaging, endoscopy and pathology reports reviewed with patient    - schedule with genetic counselor, pt afforded contact information for such   - schedule CSY 4/2025   - follow up in clinic after completion of above studies   - call clinic at any time for questions or new / worsened sx    Follow Up:   Return for Next scheduled follow up.    Plan of care reviewed with the patient at the conclusion of today's visit.  Education was provided regarding diagnosis, management, and any prescribed or recommended OTC medications.  Patient verbalized understanding of and agreement with management plan.     NOTE TO PATIENT: The 21st Century Cures Act makes medical notes like these available to patients in the interest of transparency. However, be advised this is a medical document. It is intended as peer to peer communication. It is written in medical language and may contain abbreviations or verbiage that are unfamiliar. It may appear blunt or direct. Medical documents are intended to carry relevant information, facts as evident, and the clinical opinion of the practitioner.     Time Statement:   Discussed plan of care in detail with patient today. Patient verbally understands and agrees. I have spent 30 minutes reviewing available diagnostics, obtaining history, examining the patient, developing a treatment plan, and educating the patient on disease process and plan of care.     Cate Regalado PA-C   [facility]

## 2024-06-20 ENCOUNTER — OFFICE VISIT (OUTPATIENT)
Dept: INTERNAL MEDICINE | Facility: CLINIC | Age: 24
End: 2024-06-20
Payer: COMMERCIAL

## 2024-06-20 VITALS
OXYGEN SATURATION: 98 % | HEART RATE: 72 BPM | BODY MASS INDEX: 26.46 KG/M2 | RESPIRATION RATE: 18 BRPM | HEIGHT: 64 IN | SYSTOLIC BLOOD PRESSURE: 108 MMHG | DIASTOLIC BLOOD PRESSURE: 62 MMHG | TEMPERATURE: 97.8 F | WEIGHT: 155 LBS

## 2024-06-20 DIAGNOSIS — K58.2 IRRITABLE BOWEL SYNDROME WITH BOTH CONSTIPATION AND DIARRHEA: Primary | ICD-10-CM

## 2024-06-20 PROCEDURE — 99213 OFFICE O/P EST LOW 20 MIN: CPT

## 2024-06-20 NOTE — PROGRESS NOTES
Office Note     Name: Brook Webb    : 2000     MRN: [id number]     Chief Complaint  No chief complaint on file.    Subjective     History of Present Illness:  History of Present Illness      Brook Webb is a 23 y.o. female who presents today for     3/19/2024: She has a hx of IBS, states she was dx in . Did see a GI dr then and had colonoscopy completed in . Results were negative for any polyps or lesions but she does have a small hiatal hernia. For the pat 2 years has been dealing with alternating bowel patterns. Will have several days of diarrhea and then will have bouts of constipation and bloating. She does not take any medications for this, has worked on her diet some to help with symptoms. She does have a f/u with her GI at  in 1 month via zoom, requested to be seen in person but they will only see her telehealth. Is interested in referral to GI with [facility] for evaluation. She is currently taking miralax bid and increasing her water and fiber intake. She is active and eats a healthy diet.     Saw GI, had EGD and colonoscopy completed. She was also test for celiac disease and was negative.     EGD / CSY 2024 with Dr. Real. Normal esophagus. ESCOBAR evident by free flow of gastric contents in the lower third of the esophagus. Small hiatal hernia. Bilious fluid in gastric body and fundus. Mild gastritis in gastric body. Normal duodenum. Bx negative for H Pylori, celiac disease, intestinal metaplasia or dysplasia. Adequate bowel prep conditions. Internal hemorrhoids. Normal terminal ileum, one diminutive apthous erosion in TI. 5mm polyp (tubular adenoma) in transverse colon, resected and retrieved. Normal appearing colon otherwise. Random colon bx negative for microscopic colitis. Recommend repeat CSY in 1 year.     She has started eating gluten free which has has noticed an improvement in symptoms.       Past Medical History:   Diagnosis Date    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      "\"intolerance\"    Anxiety     Asthma 6 years old    Colon polyp April 2024    Pre cancerous polyps found and removed during colonoscopy    Heart murmur Birth    IBS (irritable bowel syndrome)      Past Surgical History:   Procedure Laterality Date    ADENOIDECTOMY      COLONOSCOPY  2022    TONSILLECTOMY      UPPER GASTROINTESTINAL ENDOSCOPY         Current Outpatient Medications:     ALBUTEROL SULFATE IN, , Disp: , Rfl:     cetirizine (ZyrTEC Allergy) 10 MG tablet, Take 1 tablet by mouth As Needed for Allergies., Disp: , Rfl:     escitalopram (LEXAPRO) 20 MG tablet, TAKE ONE TABLET BY MOUTH DAILY for 30, Disp: , Rfl:     Clare 0.25-35 MG-MCG per tablet, Take 1 tablet by mouth Daily., Disp: , Rfl:     polyethylene glycol (MiraLax) 17 g packet, Take 17 g by mouth Daily As Needed., Disp: , Rfl:     propranolol (INDERAL) 10 MG tablet, Take 1 tablet by mouth Daily As Needed., Disp: , Rfl:   Allergies   Allergen Reactions    Barium Sulfate Hives    Bee Venom Hives    Latex Hives       Objective     Vital Signs  /62 (BP Location: Right arm, Patient Position: Sitting, Cuff Size: Adult)   Pulse 72   Temp 97.8 °F (36.6 °C) (Infrared)   Resp 18   Ht 162.6 cm (64.02\")   Wt 70.3 kg (155 lb)   SpO2 98%   BMI 26.59 kg/m²   Estimated body mass index is 26.59 kg/m² as calculated from the following:    Height as of this encounter: 162.6 cm (64.02\").    Weight as of this encounter: 70.3 kg (155 lb).          Physical Exam    Physical Exam  Vitals reviewed.   Constitutional:       Appearance: Normal appearance. She is not ill-appearing.   HENT:      Head: Normocephalic and atraumatic.      Nose: Nose normal.      Mouth/Throat:      Mouth: Mucous membranes are moist.      Pharynx: Oropharynx is clear. Uvula midline.      Tonsils: No tonsillar exudate or tonsillar abscesses. 0 on the right. 0 on the left.   Eyes:      Extraocular Movements: Extraocular movements intact.      Conjunctiva/sclera: Conjunctivae normal.      " Pupils: Pupils are equal, round, and reactive to light.   Neck:      Thyroid: No thyroid mass, thyromegaly or thyroid tenderness.   Cardiovascular:      Rate and Rhythm: Normal rate and regular rhythm.      Pulses: Normal pulses.           Radial pulses are 2+ on the right side and 2+ on the left side.      Heart sounds: Normal heart sounds, S1 normal and S2 normal. No murmur heard.  Pulmonary:      Effort: Pulmonary effort is normal. No tachypnea.      Breath sounds: Normal breath sounds and air entry. No decreased breath sounds, wheezing or rhonchi.   Abdominal:      General: Abdomen is flat. Bowel sounds are normal.      Palpations: Abdomen is soft.      Tenderness: There is no abdominal tenderness. There is no guarding or rebound.   Musculoskeletal:      Right lower leg: No edema.      Left lower leg: No edema.   Skin:     General: Skin is warm and dry.      Capillary Refill: Capillary refill takes less than 2 seconds.   Neurological:      General: No focal deficit present.      Mental Status: She is alert and oriented to person, place, and time. Mental status is at baseline.      Sensory: Sensation is intact.      Motor: Motor function is intact.      Coordination: Coordination is intact.      Gait: Gait is intact.   Psychiatric:         Attention and Perception: Attention and perception normal.         Mood and Affect: Mood and affect normal.         Speech: Speech normal.         Behavior: Behavior normal. Behavior is cooperative.         Thought Content: Thought content normal.         Cognition and Memory: Cognition and memory normal.         Judgment: Judgment normal.          Results             Assessment and Plan     Diagnoses and all orders for this visit:    1. Irritable bowel syndrome with both constipation and diarrhea (Primary)    pt advised to continue gluten free diet  schedule with genetic counselor, 7/2024  Repeat colonoscopy 4/2025   Assessment & Plan      Valery Abdi  APRN    Answers submitted by the patient for this visit:  Other (Submitted on 6/20/2024)  Please describe your symptoms.: Check up  Have you had these symptoms before?: No  How long have you been having these symptoms?: 1-4 days  Please list any medications you are currently taking for this condition.: Lexapro 20mg , Propanolol 10 mg, Estarylla (birth control)  Primary Reason for Visit (Submitted on 6/20/2024)  What is the primary reason for your visit?: Other

## 2024-08-12 ENCOUNTER — TELEPHONE (OUTPATIENT)
Dept: GENETICS | Facility: HOSPITAL | Age: 24
End: 2024-08-12
Payer: COMMERCIAL

## 2024-08-15 ENCOUNTER — CLINICAL SUPPORT (OUTPATIENT)
Facility: HOSPITAL | Age: 24
End: 2024-08-15
Payer: COMMERCIAL

## 2024-08-15 ENCOUNTER — HOSPITAL ENCOUNTER (OUTPATIENT)
Facility: HOSPITAL | Age: 24
Discharge: HOME | End: 2024-08-15
Payer: COMMERCIAL

## 2024-08-15 VITALS
SYSTOLIC BLOOD PRESSURE: 109 MMHG | DIASTOLIC BLOOD PRESSURE: 72 MMHG | HEART RATE: 75 BPM | RESPIRATION RATE: 16 BRPM | WEIGHT: 155.6 LBS | BODY MASS INDEX: 26.69 KG/M2 | TEMPERATURE: 98 F

## 2024-08-15 DIAGNOSIS — Z13.79 GENETIC SCREENING: Primary | ICD-10-CM

## 2024-08-15 DIAGNOSIS — Z13.79 GENETIC TESTING: Primary | ICD-10-CM

## 2024-08-15 DIAGNOSIS — Z80.42 FAMILY HISTORY OF PROSTATE CANCER: ICD-10-CM

## 2024-08-15 DIAGNOSIS — Z80.0 FAMILY HISTORY OF THROAT CANCER: ICD-10-CM

## 2024-08-15 DIAGNOSIS — Z86.010 PERSONAL HISTORY OF COLONIC POLYPS: ICD-10-CM

## 2024-08-15 DIAGNOSIS — Z83.719 FAMILY HISTORY OF COLONIC POLYPS: ICD-10-CM

## 2024-08-15 PROCEDURE — 36415 COLL VENOUS BLD VENIPUNCTURE: CPT

## 2024-08-15 PROCEDURE — 96040: CPT | Performed by: GENETIC COUNSELOR, MS

## 2024-08-15 NOTE — PROGRESS NOTES
Brook Webb, a 24 y.o. female, was referred for genetic counseling due to a personal history of colon polyps at a young age. She was 11 years old at menarche and is nulliparous. She had her first colonoscopy in 2022 and reports one small polyp was removed (records not available for review). She had a second colonoscopy in April 2024 and one tubular adenoma was removed. Ms. Webb retains her uterus and ovaries. She was interested in discussing her risk for a hereditary cancer syndrome. Ms. Webb was interested in pursuing a multigene panel, and therefore the CancerNext-Expanded panel was ordered through [employer] which analyzes 71 genes associated with increased cancer and polyposis risks.     FAMILY HISTORY:  Pat. Grandmother:  Throat cancer, 46  Pat. Grandfather:  Prostate cancer, 75  Father:    H/o 3 precancerous polyps in his 50s  Mat. Grandmother:  6 colon polyps     We do not have medical records confirming the diagnoses in Ms. Webb's family.    RISK ASSESSMENT: Ms. Webb's persona history of colon polyps led to concern regarding a hereditary cancer syndrome.  Based on the information available today, Ms. Webb does not meet NCCN guidelines criteria for genetic testing. We discussed that although not meeting NCCN criteria may reduce the likelihood of an identifiable mutation, it does not eliminate the possibility due to incomplete penetrance/variable expressivity.  In addition, her history of colon polyps at an early age warrants consideration of testing.  We discussed that genetic testing is still available to her through a multigene panel which can evaluate multiple cancer susceptibility simultaneously. She was interested in proceeding with multigene panel testing. If genetic testing is negative, Ms. Haydens management should be guided by family history.     GENETIC COUNSELING (30 minutes):  We reviewed the family history information in detail.  Cases of cancer follow three general  patterns: sporadic, familial, and hereditary.  While most cancer is sporadic, some cases appear to occur in family clusters.  These cases are said to be familial and account for 10-20% of certain cancer cases.  Familial cases may be due to a combination of shared genes and environmental factors among family members.  In even fewer families (~10%), the cancer is said to be inherited, and the genes responsible for the cancer are known.      Family histories typical of hereditary cancer syndromes usually include multiple first- and second-degree relatives diagnosed with cancer types that define a syndrome.  These cases tend to be diagnosed at younger-than-expected ages and can be bilateral or multifocal.  The cancer in these families follows an autosomal dominant inheritance pattern, which indicates the likely presence of a mutation in a cancer susceptibility gene.  Children and siblings of an individual believed to carry this mutation have a 50% chance of inheriting that mutation, thereby inheriting the increased risk to develop cancer.  These mutations can be passed down from the maternal or the paternal lineage.    Given her personal history of early onset colon polyps, we discussed the possibility of a hereditary colon cancer syndrome. We discussed Potts syndrome, as well as the possibility of a hereditary polyposis syndrome, including AFAP or MAP. These genes are not responsible for every case of hereditary colon cancer, and we discussed multigene panels that can evaluate a number of additional cancer related genes simultaneously. The standard approach to genetic testing is via a multigene panel.  Genes included on these panels have varying degrees of risk associated, and management and screening guidelines vary based on the specific gene.  Hereditary cancer syndromes can demonstrate incomplete penetrance and variable expression within families. There are genes that are evaluated that have been more recently  described, and there may be less data regarding the risks and therefore may not have established management guidelines at this time. We discussed the possibility of results that are unexpected based on family history. Based on Ms. Webb's personal and family history and her desire to get more information regarding her personal risks and risks for her family, she opted to pursue testing through a panel evaluating several other genes known to increase the risk for cancer.     GENETIC TESTING:  The risks, benefits and limitations of genetic testing and implications for clinical management following testing were reviewed. DNA test results can influence decisions regarding screening and prevention.  Genetic testing can have significant psychological implications for both individuals and families. Also discussed was the possibility of employment and insurance discrimination based on genetic test results and the federal and states laws that are in place to prevent this (MARYAN), as well as the limitations of these laws.         We discussed multigene panel testing, which would involve testing several genes associated with an increased cancer risk. The benefits and limitations of genetic testing were discussed and Ms. Webb decided to pursue testing of the genes via the panel. The implications of a positive or negative test result were discussed.  We discussed the possibility that, in some cases, genetic test results may be ambiguous due to the identification of a genetic variant of uncertain significance (VUS). These variants may or may not be associated with an increased cancer risk. With multigene panel testing, it is not uncommon for a VUS to be identified.  If a VUS is identified, testing family members is not recommended and screening recommendations are made based on the family history.  The laboratories that perform genetic testing work to reclassify the VUS and send out an amended report if and when a VUS is  reclassified.  The majority of variant findings are ultimately reclassified to a negative result. Given her family history, a negative test result does not eliminate all cancer risk, although the risk would not be as high as it would with positive genetic testing.     PLAN:  Genetic testing via the CancerNext-Expanded panel through [facility] [facility] was ordered. A blood sample was collected today 8/15/2024 at [facility]. Results are expected in 2-3 weeks and we will contact Ms. Webb with her results once they are received. She can contact us at [phone] with any questions in the meantime.       Ada Sanchez MS, [facility] [place]  Licensed Certified Genetic Counselor

## 2024-08-26 ENCOUNTER — TELEPHONE (OUTPATIENT)
Dept: GENETICS | Facility: HOSPITAL | Age: 24
End: 2024-08-26
Payer: COMMERCIAL

## 2024-08-26 ENCOUNTER — DOCUMENTATION (OUTPATIENT)
Dept: GENETICS | Facility: HOSPITAL | Age: 24
End: 2024-08-26
Payer: COMMERCIAL

## 2024-08-26 NOTE — TELEPHONE ENCOUNTER
Spoke with patient and disclosed negative genetic results. Informed patient these results would be on [facility] and sent to her Dr. Patient requested a copy be mailed to her.

## 2025-07-08 ENCOUNTER — OFFICE VISIT (OUTPATIENT)
Dept: INTERNAL MEDICINE | Facility: CLINIC | Age: 25
End: 2025-07-08
Payer: COMMERCIAL

## 2025-07-08 VITALS
WEIGHT: 154.2 LBS | HEIGHT: 65 IN | HEART RATE: 76 BPM | BODY MASS INDEX: 25.69 KG/M2 | DIASTOLIC BLOOD PRESSURE: 72 MMHG | SYSTOLIC BLOOD PRESSURE: 116 MMHG | TEMPERATURE: 97.8 F | OXYGEN SATURATION: 98 % | RESPIRATION RATE: 20 BRPM

## 2025-07-08 DIAGNOSIS — F41.9 ANXIETY: ICD-10-CM

## 2025-07-08 DIAGNOSIS — K58.2 IRRITABLE BOWEL SYNDROME WITH BOTH CONSTIPATION AND DIARRHEA: ICD-10-CM

## 2025-07-08 DIAGNOSIS — Z00.00 ROUTINE GENERAL MEDICAL EXAMINATION AT A HEALTH CARE FACILITY: Primary | ICD-10-CM

## 2025-07-08 DIAGNOSIS — J45.990 EXERCISE-INDUCED ASTHMA: ICD-10-CM

## 2025-07-08 PROCEDURE — 99395 PREV VISIT EST AGE 18-39: CPT | Performed by: PHYSICIAN ASSISTANT

## 2025-07-08 NOTE — PROGRESS NOTES
Chief Complaint  Annual Exam    Subjective          History of Present Illness  Brook Webb presents to [facility] for a routine physical.  History of Present Illness  Anx:  Doing well on Lexparo 20 and prn propranolol, has been on Lexapro since 2018.  Sees psych a 6 months through [facility]. Tried/failed zoloft. No HI/SI.     Asthma:  Triggered by exercise, uses alb prn.    Has never had a positive test for Tb or been infected with Tb.  Denies symptoms of active Tb and risk factors for acquiring latent or active Tb.   Has not had a cough> 3 weeks, hemoptysis, unexplained fever, unexplained weight loss, fatigue, night sweats, or change in appetite.  Not a high risk contact of person known or suspected of having Tb.  Has not been to another country for 3 or more months where Tb is common.  Has been in the  for > 5 years  Is not a resident or employee of high Tb risk congregate setting.  Is not a health care worker who serves high-risk patients.  Is not medically underserved.  Has not been homeless in past 2 years.  Does not inject illicit drugs or use crack cocaine.  Is not HIV positive, or considered at risk for HIV if status is unknown.   Is not imunosuppressed or on immunosuppressive therapy.  Is not malnourished or >10% below ideal body weight.          Diet/exercise: has healthy diet and exercises regulary.   Eye exams: [facility], [email or web address] with exams, wears contacts  Dental exams: [facility] dentistry, [facility] [[email or web address] on exam    Little interest or pleasure in doing things? Not at all   Feeling down, depressed, or hopeless? Not at all   PHQ-2 Total Score 0         Patient's last menstrual period was 07/02/2025 (exact date).   reports being sexually active and has had partner(s) who are male. She reports using the following method of birth control/protection: Birth control pill.  Cancer-related family history is not on file.    Mammogram- no fhx breast CA, due age  39  Pap- followed by GYN 6/6/2025 nl  Colonoscopy- 4/2024 showed a polyp, she has to get them every 5 years now. Had colonoscopy initially d/t IBS, no fhx of colon CA.        reports that she has never smoked. She has never used smokeless tobacco.     Health Maintenance   Topic Date Due    Pneumococcal Vaccine 0-49 (1 of 1 - PPSV23) 08/05/2005    HEPATITIS C SCREENING  Never done    COVID-19 Vaccine (5 - 2024-25 season) 09/01/2024    INFLUENZA VACCINE  10/01/2025    ANNUAL PHYSICAL  07/08/2026    PAP SMEAR  06/06/2028    COLORECTAL CANCER SCREENING  04/12/2029    TDAP/TD VACCINES (4 - Td or Tdap) 11/27/2034    HPV VACCINES  Completed       Immunization History   Administered Date(s) Administered    COVID-19 (PFIZER) BIVALENT 12+YRS 11/02/2022    COVID-19 (PFIZER) Purple Cap Monovalent 02/27/2021, 03/23/2021, 10/18/2021    DTaP / HiB / IPV 1999, 1999, 02/07/2000, 02/26/2001, 08/12/2003    DTaP, Unspecified 1999, 1999, 02/07/2000, 02/26/2001, 08/12/2003    Fluzone  >6mos 11/27/2024    Fluzone (or Fluarix & Flulaval for VFC) >6mos 10/17/2017, 11/13/2018, 10/13/2020, 11/08/2021    HPV, Unspecified 09/30/2009, 12/16/2009, 10/01/2010    Hep B, Unspecified 02/07/2000, 05/10/2000, 08/17/2000    Hepatitis A Pediatric Unspecified 10/11/2011, 10/12/2012    Hepatitis B 2 Dose Vaccine Heplisav-B 11/27/2024    HiB 1999, 1999, 02/07/2000, 11/14/2000    IPV 1999, 1999, 02/26/2001, 08/12/2003    Influenza Injectable Mdck Pf Quad 11/15/2023    Influenza, Unspecified 09/03/2004, 11/12/2005, 11/01/2006, 10/28/2007, 09/29/2008, 10/01/2010, 10/12/2012, 10/17/2014, 10/13/2015, 08/25/2016    MMR 11/14/2000, 08/12/2003    Meningococcal B,(Bexsero) 08/25/2016, 10/06/2016    Meningococcal MCV4P (Menactra) 09/30/2009    Meningococcal, Unspecified 08/24/2015    Pneumococcal Conjugate 13-Valent (PCV13) 05/10/2000, 08/17/2000    Pneumococcal, Unspecified 05/10/2000, 08/17/2000    Td (TDVAX)  "07/15/2019    Tdap 09/30/2009, 11/27/2024    Typhoid Live 04/24/2018    Varicella 08/17/2000, 02/26/2001, 09/28/2007         The following portions of the patient's history were reviewed and updated as appropriate: allergies, current medications, past family history, past medical history, past social history, past surgical history and problem list.    Patient Active Problem List   Diagnosis    Routine general medical examination at a health care facility    Anxiety    Irritable bowel syndrome with both constipation and diarrhea    Exercise-induced asthma     Allergies   Allergen Reactions    Barium Sulfate Hives    Bee Venom Hives    Latex Hives       Current Outpatient Medications:     ALBUTEROL SULFATE IN, , Disp: , Rfl:     cetirizine (ZyrTEC Allergy) 10 MG tablet, Take 1 tablet by mouth As Needed for Allergies., Disp: , Rfl:     escitalopram (LEXAPRO) 20 MG tablet, TAKE ONE TABLET BY MOUTH DAILY for 30, Disp: , Rfl:     Clare 0.25-35 MG-MCG per tablet, Take 1 tablet by mouth Daily., Disp: , Rfl:     polyethylene glycol (MiraLax) 17 g packet, Take 17 g by mouth Daily As Needed., Disp: , Rfl:     propranolol (INDERAL) 10 MG tablet, Take 1 tablet by mouth Daily As Needed., Disp: , Rfl:   Past Medical History:   Diagnosis Date    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     \"intolerance\"    Anxiety     Asthma 6 years old    Colon polyp April 2024    Pre cancerous polyps found and removed during colonoscopy    Heart murmur Birth    Hernia Septemeber 2022    Hiatal hernia    IBS (irritable bowel syndrome)       Past Surgical History:   Procedure Laterality Date    ADENOIDECTOMY      COLONOSCOPY  2022    TONSILLECTOMY      UPPER GASTROINTESTINAL ENDOSCOPY        Family History   Problem Relation Age of Onset    Alcohol abuse Father       Social History     Socioeconomic History    Marital status: Single   Tobacco Use    Smoking status: Never    Smokeless tobacco: Never   Vaping Use    Vaping status: Never Used   Substance and Sexual Activity " "   Alcohol use: Yes     Alcohol/week: 3.0 standard drinks of alcohol     Types: 3 Drinks containing 0.5 oz of alcohol per week    Drug use: Never    Sexual activity: Yes     Partners: Male     Birth control/protection: Birth control pill        Objective   Vital Signs:   Vitals:    07/08/25 0941   BP: 116/72   Pulse: 76   Resp: 20   Temp: 97.8 °F (36.6 °C)   TempSrc: Temporal   SpO2: 98%   Weight: 69.9 kg (154 lb 3.2 oz)   Height: 163.8 cm (64.5\")   PainSc: 0-No pain      Body mass index is 26.06 kg/m².  BMI is >= 25 and <30. (Overweight) The following options were offered after discussion;: exercise counseling/recommendations and nutrition counseling/recommendations    Physical Exam  Vitals reviewed.   Constitutional:       General: She is not in acute distress.     Appearance: Normal appearance.   HENT:      Head: Normocephalic and atraumatic.   Eyes:      General: No scleral icterus.     Extraocular Movements: Extraocular movements intact.      Conjunctiva/sclera: Conjunctivae normal.   Cardiovascular:      Rate and Rhythm: Normal rate and regular rhythm.      Heart sounds: Normal heart sounds. No murmur heard.  Pulmonary:      Effort: Pulmonary effort is normal. No respiratory distress.      Breath sounds: Normal breath sounds. No stridor. No wheezing or rhonchi.   Musculoskeletal:      Cervical back: Normal range of motion and neck supple.   Skin:     General: Skin is warm and dry.      Coloration: Skin is not jaundiced.   Neurological:      General: No focal deficit present.      Mental Status: She is alert and oriented to person, place, and time.      Gait: Gait normal.   Psychiatric:         Mood and Affect: Mood normal.         Behavior: Behavior normal.          Result Review :              Assessment and Plan       Routine general medical examination at a health care facility    Anxiety  Chronic, stable cont Lexapro and propranolol  Irritable bowel syndrome with both constipation and diarrhea  F/u with GI " as dir  Exercise-induced asthma  Cont alb prn    No orders of the defined types were placed in this encounter.    No orders of the defined types were placed in this encounter.      Follow Up   Return in about 1 year (around 7/8/2026) for Yearly Physical.    Counseled on health maintenance topics and preventative care recommendations. Follow up yearly for routine physical exam. Diet and exercise counseling given. See dentist and eye doctor yearly as directed.    If a referral was made please contact our office if you have not heard about an appointment in the next 2 weeks.   If labs or images are ordered we will contact you with the results within the next week.  If you have not heard from us after a week please call our office to inquire about the results.    Ada Belle PA-C    Patient was given instructions and counseling regarding her condition or for health maintenance advice. Please see specific information pulled into the AVS if appropriate.     * Please note that portions of this note were completed with a voice recognition program.